# Patient Record
Sex: FEMALE | Race: WHITE | NOT HISPANIC OR LATINO | URBAN - METROPOLITAN AREA
[De-identification: names, ages, dates, MRNs, and addresses within clinical notes are randomized per-mention and may not be internally consistent; named-entity substitution may affect disease eponyms.]

---

## 2025-02-11 ENCOUNTER — OFFICE VISIT (OUTPATIENT)
Dept: URGENT CARE | Age: 20
End: 2025-02-11
Payer: COMMERCIAL

## 2025-02-11 VITALS
TEMPERATURE: 98.1 F | HEART RATE: 104 BPM | DIASTOLIC BLOOD PRESSURE: 77 MMHG | SYSTOLIC BLOOD PRESSURE: 111 MMHG | OXYGEN SATURATION: 98 % | RESPIRATION RATE: 16 BRPM

## 2025-02-11 DIAGNOSIS — J02.9 SORE THROAT: ICD-10-CM

## 2025-02-11 DIAGNOSIS — R50.9 FEVER, UNSPECIFIED FEVER CAUSE: ICD-10-CM

## 2025-02-11 DIAGNOSIS — J11.1 INFLUENZA: Primary | ICD-10-CM

## 2025-02-11 LAB
POC RAPID INFLUENZA A: NEGATIVE
POC RAPID INFLUENZA B: NEGATIVE
POC RAPID STREP: NEGATIVE

## 2025-02-11 ASSESSMENT — ENCOUNTER SYMPTOMS
SINUS PRESSURE: 0
TROUBLE SWALLOWING: 0
ABDOMINAL PAIN: 0
SORE THROAT: 1
CHEST TIGHTNESS: 0
FEVER: 1
EYE PAIN: 0
RHINORRHEA: 1
COUGH: 1
APPETITE CHANGE: 0
FATIGUE: 1
SHORTNESS OF BREATH: 0
MYALGIAS: 0
VOICE CHANGE: 0
WHEEZING: 0
SINUS PAIN: 0
ACTIVITY CHANGE: 0
FACIAL SWELLING: 0
DIZZINESS: 0
EYE DISCHARGE: 0
CHILLS: 0

## 2025-02-11 NOTE — PROGRESS NOTES
Subjective   Patient ID: Rena Quan is a 19 y.o. female. They present today with a chief complaint of Generalized Body Aches and Fever.    History of Present Illness    History provided by:  Patient  Fever   Associated symptoms include congestion, coughing and a sore throat. Pertinent negatives include no abdominal pain, chest pain, ear pain or wheezing.   Patient has had fever, cough, and sore throat for 2 days. She went to the [facility] and tested negative for covid. Denies asthma or lung disease. OTC medication with minimal relief.     Past Medical History  Allergies as of 02/11/2025 - Reviewed 02/11/2025   Allergen Reaction Noted    Amoxicillin Unknown 02/11/2025       (Not in a hospital admission)       Past Medical History:   Diagnosis Date    Body mass index (BMI) pediatric, less than 5th percentile for age 07/20/2020    BMI (body mass index), pediatric, less than 5th percentile for age    Delayed puberty 07/20/2020    Delayed menarche    Encounter for full-term uncomplicated delivery ([facility]) 03/20/2019    FTND (full term normal delivery)    Imbalance of constituents of food intake 03/24/2019    Dietary imbalance    Migraine with aura, not intractable, without status migrainosus 07/17/2019    Migraine with aura, not intractable, without status migrainosus    Other viral warts 07/16/2018    Common wart    Personal history of other diseases of the respiratory system 09/03/2019    History of acute pharyngitis    Personal history of other endocrine, nutritional and metabolic disease 08/24/2020    History of delayed puberty    Personal history of other endocrine, nutritional and metabolic disease 07/16/2018    History of high cholesterol    Personal history of other medical treatment 03/21/2019    H/O CT scan of chest    Personal history of other specified conditions 06/12/2018    History of nausea    Personal history of other specified conditions 03/21/2019    History of persistent cough     Shortness of breath 03/20/2019    Shortness of breath on exertion    Underweight 07/20/2020    Underweight in adolescence       Past Surgical History:   Procedure Laterality Date    OTHER SURGICAL HISTORY  03/20/2019    Ear Pressure Equalization Tube, Insertion    OTHER SURGICAL HISTORY  03/20/2019    Probing Of Nasolacrimal Duct    TONSILLECTOMY  03/20/2019    Tonsillectomy With Adenoidectomy            Review of Systems  Review of Systems   Constitutional:  Positive for fatigue and fever. Negative for activity change, appetite change and chills.   HENT:  Positive for congestion, postnasal drip, rhinorrhea and sore throat. Negative for ear pain, facial swelling, mouth sores, sinus pressure, sinus pain, trouble swallowing and voice change.    Eyes:  Negative for pain and discharge.   Respiratory:  Positive for cough. Negative for chest tightness, shortness of breath and wheezing.    Cardiovascular:  Negative for chest pain.   Gastrointestinal:  Negative for abdominal pain.   Musculoskeletal:  Negative for myalgias.   Neurological:  Negative for dizziness and syncope.                                  Objective    Vitals:    02/11/25 1825   BP: 111/77   Pulse: 104   Resp: 16   Temp: 36.7 °C (98.1 °F)   SpO2: 98%     No LMP recorded.    Physical Exam  Vitals reviewed.   Constitutional:       General: She is not in acute distress.     Appearance: Normal appearance.   HENT:      Right Ear: Tympanic membrane normal.      Left Ear: Tympanic membrane normal.      Nose: Congestion and rhinorrhea present.      Mouth/Throat:      Pharynx: No oropharyngeal exudate or posterior oropharyngeal erythema.   Eyes:      Conjunctiva/sclera: Conjunctivae normal.   Cardiovascular:      Rate and Rhythm: Normal rate and regular rhythm.   Pulmonary:      Effort: Pulmonary effort is normal.      Breath sounds: Normal breath sounds.   Skin:     General: Skin is warm and dry.   Neurological:      General: No focal deficit present.      Mental  Status: She is alert.         Procedures    Point of Care Test & Imaging Results from this visit  Results for orders placed or performed in visit on 02/11/25   POCT Influenza A/B manually resulted   Result Value Ref Range    POC Rapid Influenza A Negative Negative    POC Rapid Influenza B Negative Negative   POCT rapid strep A manually resulted   Result Value Ref Range    POC Rapid Strep Negative Negative      No results found.    Diagnostic study results (if any) were reviewed by RENEA Espitia.    Assessment/Plan   Allergies, medications, history, and pertinent labs/EKGs/Imaging reviewed by RENEA Espitia.     Medical Decision Making  The patient presents with symptoms consistent with influenza. The differential diagnosis includes influenza, other viral upper respiratory infections, bacterial pneumonia, COVID-19, and streptococcal pharyngitis. Based on the history and physical examination, the symptoms are consistent with influenza, as the patient reports a rapid onset of fever, myalgias, and upper respiratory symptoms without signs of bacterial infection or significant respiratory distress. The patient denies red flag symptoms such as difficulty breathing, persistent high fever, chest pain, confusion, or inability to tolerate oral intake.  The patient was counseled on red flags, including worsening shortness of breath, persistent or worsening fever, chest pain, confusion, or signs of dehydration, and was advised to seek immediate care if these occur. Treatment includes supportive care with hydration, rest, and over-the-counter medications such as acetaminophen or ibuprofen for fever and body aches. Follow-up was recommended if symptoms worsen or do not improve within 3-5 days or if new concerns arise. The patient verbalized understanding and agreed with the plan.      Orders and Diagnoses  Diagnoses and all orders for this visit:  Influenza  Sore throat  -     POCT Influenza A/B  manually resulted  -     POCT rapid strep A manually resulted  Fever, unspecified fever cause  -     POCT Influenza A/B manually resulted  -     POCT rapid strep A manually resulted      Medical Admin Record      Patient disposition: Home    Electronically signed by RENEA Espitia  6:44 PM